# Patient Record
Sex: MALE | Race: WHITE | NOT HISPANIC OR LATINO | URBAN - METROPOLITAN AREA
[De-identification: names, ages, dates, MRNs, and addresses within clinical notes are randomized per-mention and may not be internally consistent; named-entity substitution may affect disease eponyms.]

---

## 2023-02-10 PROBLEM — S46.212S: Status: ACTIVE | Noted: 2023-02-10

## 2023-02-10 PROBLEM — R73.03 PREDIABETES: Status: ACTIVE | Noted: 2023-02-10

## 2023-02-10 PROBLEM — R20.2 LEFT HAND PARESTHESIA: Status: ACTIVE | Noted: 2023-02-10

## 2023-02-10 PROBLEM — D22.9 MULTIPLE NEVI: Status: ACTIVE | Noted: 2023-02-10

## 2023-02-10 PROBLEM — N52.9 MALE ERECTILE DISORDER: Status: ACTIVE | Noted: 2023-02-10

## 2023-02-10 PROBLEM — R21 RASH, SKIN: Status: ACTIVE | Noted: 2023-02-10

## 2023-02-10 PROBLEM — M25.541 JOINT PAIN IN BOTH HANDS: Status: ACTIVE | Noted: 2023-02-10

## 2023-02-10 PROBLEM — N50.82 ACUTE PAIN IN SCROTUM: Status: ACTIVE | Noted: 2023-02-10

## 2023-02-10 PROBLEM — M54.2 NECK PAIN: Status: ACTIVE | Noted: 2023-02-10

## 2023-02-10 PROBLEM — E66.9 OBESITY (BMI 30-39.9): Status: ACTIVE | Noted: 2023-02-10

## 2023-02-10 PROBLEM — E78.5 DYSLIPIDEMIA: Status: ACTIVE | Noted: 2023-02-10

## 2023-02-10 PROBLEM — H60.12: Status: ACTIVE | Noted: 2023-02-10

## 2023-02-10 PROBLEM — M19.012 ARTHRITIS OF LEFT ACROMIOCLAVICULAR JOINT: Status: ACTIVE | Noted: 2023-02-10

## 2023-02-10 PROBLEM — G89.29 MID BACK PAIN, CHRONIC: Status: ACTIVE | Noted: 2023-02-10

## 2023-02-10 PROBLEM — M67.814 BICEPS TENDINOSIS OF LEFT SHOULDER: Status: ACTIVE | Noted: 2023-02-10

## 2023-02-10 PROBLEM — M25.539 WRIST PAIN: Status: ACTIVE | Noted: 2023-02-10

## 2023-02-10 PROBLEM — M25.542 JOINT PAIN IN BOTH HANDS: Status: ACTIVE | Noted: 2023-02-10

## 2023-02-10 PROBLEM — G89.29 CHRONIC BILATERAL LOW BACK PAIN WITHOUT SCIATICA: Status: ACTIVE | Noted: 2023-02-10

## 2023-02-10 PROBLEM — H60.549 DERMATITIS OF EAR CANAL: Status: ACTIVE | Noted: 2023-02-10

## 2023-02-10 PROBLEM — M67.929 BICEPS TENDINOPATHY: Status: ACTIVE | Noted: 2023-02-10

## 2023-02-10 PROBLEM — G47.30 SLEEP APNEA: Status: ACTIVE | Noted: 2023-02-10

## 2023-02-10 PROBLEM — M54.9 MID BACK PAIN, CHRONIC: Status: ACTIVE | Noted: 2023-02-10

## 2023-02-10 PROBLEM — R40.0 DAYTIME SOMNOLENCE: Status: ACTIVE | Noted: 2023-02-10

## 2023-02-10 PROBLEM — L30.1 DYSHYDROSIS: Status: ACTIVE | Noted: 2023-02-10

## 2023-02-10 PROBLEM — M54.50 CHRONIC BILATERAL LOW BACK PAIN WITHOUT SCIATICA: Status: ACTIVE | Noted: 2023-02-10

## 2023-02-10 PROBLEM — G56.32: Status: ACTIVE | Noted: 2023-02-10

## 2023-02-10 PROBLEM — M25.511 CHRONIC PAIN OF BOTH SHOULDERS: Status: RESOLVED | Noted: 2023-02-10 | Resolved: 2023-02-10

## 2023-02-10 PROBLEM — G47.00 PERSISTENT INSOMNIA: Status: ACTIVE | Noted: 2023-02-10

## 2023-02-10 PROBLEM — G89.29 CHRONIC PAIN OF BOTH SHOULDERS: Status: RESOLVED | Noted: 2023-02-10 | Resolved: 2023-02-10

## 2023-02-10 PROBLEM — K21.9 CHRONIC GERD: Status: ACTIVE | Noted: 2023-02-10

## 2023-02-10 PROBLEM — M25.512 CHRONIC PAIN OF BOTH SHOULDERS: Status: RESOLVED | Noted: 2023-02-10 | Resolved: 2023-02-10

## 2023-02-10 PROBLEM — S60.552A: Status: ACTIVE | Noted: 2023-02-10

## 2023-02-10 PROBLEM — G56.02 CARPAL TUNNEL SYNDROME OF LEFT WRIST: Status: ACTIVE | Noted: 2023-02-10

## 2023-02-10 PROBLEM — R10.13 DYSPEPSIA: Status: ACTIVE | Noted: 2023-02-10

## 2023-02-10 RX ORDER — CLOBETASOL PROPIONATE 0.5 MG/G
1 CREAM TOPICAL DAILY
COMMUNITY
End: 2024-04-09 | Stop reason: SDUPTHER

## 2023-02-10 RX ORDER — CLOBETASOL PROPIONATE 0.5 MG/G
1 CREAM TOPICAL 2 TIMES DAILY
COMMUNITY
End: 2023-03-06 | Stop reason: SDUPTHER

## 2023-02-10 RX ORDER — ESOMEPRAZOLE MAGNESIUM 40 MG/1
40 CAPSULE, DELAYED RELEASE ORAL
COMMUNITY
Start: 2019-10-02

## 2023-02-28 LAB
ALANINE AMINOTRANSFERASE (SGPT) (U/L) IN SER/PLAS: 20 U/L (ref 10–52)
ALBUMIN (G/DL) IN SER/PLAS: 4.3 G/DL (ref 3.4–5)
ALKALINE PHOSPHATASE (U/L) IN SER/PLAS: 60 U/L (ref 33–120)
ANION GAP IN SER/PLAS: 12 MMOL/L (ref 10–20)
APPEARANCE, URINE: NORMAL
ASPARTATE AMINOTRANSFERASE (SGOT) (U/L) IN SER/PLAS: 18 U/L (ref 9–39)
BILIRUBIN TOTAL (MG/DL) IN SER/PLAS: 0.9 MG/DL (ref 0–1.2)
BILIRUBIN, URINE: NEGATIVE
BLOOD, URINE: NEGATIVE
CALCIUM (MG/DL) IN SER/PLAS: 9.3 MG/DL (ref 8.6–10.6)
CARBON DIOXIDE, TOTAL (MMOL/L) IN SER/PLAS: 29 MMOL/L (ref 21–32)
CHLORIDE (MMOL/L) IN SER/PLAS: 104 MMOL/L (ref 98–107)
CHOLESTEROL (MG/DL) IN SER/PLAS: 161 MG/DL (ref 0–199)
CHOLESTEROL IN HDL (MG/DL) IN SER/PLAS: 39.9 MG/DL
CHOLESTEROL/HDL RATIO: 4
COLOR, URINE: YELLOW
CREATININE (MG/DL) IN SER/PLAS: 0.9 MG/DL (ref 0.5–1.3)
ERYTHROCYTE DISTRIBUTION WIDTH (RATIO) BY AUTOMATED COUNT: 11.6 % (ref 11.5–14.5)
ERYTHROCYTE MEAN CORPUSCULAR HEMOGLOBIN CONCENTRATION (G/DL) BY AUTOMATED: 33.8 G/DL (ref 32–36)
ERYTHROCYTE MEAN CORPUSCULAR VOLUME (FL) BY AUTOMATED COUNT: 91 FL (ref 80–100)
ERYTHROCYTES (10*6/UL) IN BLOOD BY AUTOMATED COUNT: 5.01 X10E12/L (ref 4.5–5.9)
GFR MALE: >90 ML/MIN/1.73M2
GLUCOSE (MG/DL) IN SER/PLAS: 91 MG/DL (ref 74–99)
GLUCOSE, URINE: NEGATIVE MG/DL
HEMATOCRIT (%) IN BLOOD BY AUTOMATED COUNT: 45.6 % (ref 41–52)
HEMOGLOBIN (G/DL) IN BLOOD: 15.4 G/DL (ref 13.5–17.5)
KETONES, URINE: NEGATIVE MG/DL
LDL: 92 MG/DL (ref 0–99)
LEUKOCYTE ESTERASE, URINE: NEGATIVE
LEUKOCYTES (10*3/UL) IN BLOOD BY AUTOMATED COUNT: 6.9 X10E9/L (ref 4.4–11.3)
NITRITE, URINE: NEGATIVE
NRBC (PER 100 WBCS) BY AUTOMATED COUNT: 0 /100 WBC (ref 0–0)
PH, URINE: 5 (ref 5–8)
PLATELETS (10*3/UL) IN BLOOD AUTOMATED COUNT: 253 X10E9/L (ref 150–450)
POTASSIUM (MMOL/L) IN SER/PLAS: 3.6 MMOL/L (ref 3.5–5.3)
PROSTATE SPECIFIC AG (NG/ML) IN SER/PLAS: 1.11 NG/ML (ref 0–4)
PROTEIN TOTAL: 6.9 G/DL (ref 6.4–8.2)
PROTEIN, URINE: NEGATIVE MG/DL
SODIUM (MMOL/L) IN SER/PLAS: 141 MMOL/L (ref 136–145)
SPECIFIC GRAVITY, URINE: 1.03 (ref 1–1.03)
THYROTROPIN (MIU/L) IN SER/PLAS BY DETECTION LIMIT <= 0.05 MIU/L: 2.15 MIU/L (ref 0.44–3.98)
TRIGLYCERIDE (MG/DL) IN SER/PLAS: 146 MG/DL (ref 0–149)
UREA NITROGEN (MG/DL) IN SER/PLAS: 19 MG/DL (ref 6–23)
UROBILINOGEN, URINE: <2 MG/DL (ref 0–1.9)
VLDL: 29 MG/DL (ref 0–40)

## 2023-03-01 LAB
ANTI-NUCLEAR ANTIBODY (ANA): NEGATIVE
RHEUMATOID FACTOR (IU/ML) IN SERUM OR PLASMA: <10 IU/ML (ref 0–15)
SEDIMENTATION RATE, ERYTHROCYTE: 9 MM/H (ref 0–20)
URATE (MG/DL) IN SER/PLAS: 4.7 MG/DL (ref 4–7.5)

## 2023-03-06 ENCOUNTER — OFFICE VISIT (OUTPATIENT)
Dept: PRIMARY CARE | Facility: CLINIC | Age: 53
End: 2023-03-06
Payer: COMMERCIAL

## 2023-03-06 VITALS
BODY MASS INDEX: 29.33 KG/M2 | HEART RATE: 81 BPM | DIASTOLIC BLOOD PRESSURE: 84 MMHG | SYSTOLIC BLOOD PRESSURE: 114 MMHG | TEMPERATURE: 97.6 F | RESPIRATION RATE: 16 BRPM | OXYGEN SATURATION: 97 % | HEIGHT: 69 IN | WEIGHT: 198 LBS

## 2023-03-06 DIAGNOSIS — R74.8 LOW SERUM HDL: ICD-10-CM

## 2023-03-06 DIAGNOSIS — E66.3 OVERWEIGHT (BMI 25.0-29.9): ICD-10-CM

## 2023-03-06 DIAGNOSIS — Z12.5 SCREENING FOR PROSTATE CANCER: ICD-10-CM

## 2023-03-06 DIAGNOSIS — Z13.228 SCREENING FOR METABOLIC DISORDER: ICD-10-CM

## 2023-03-06 DIAGNOSIS — Z00.00 HEALTH MAINTENANCE EXAMINATION: Primary | ICD-10-CM

## 2023-03-06 DIAGNOSIS — Z13.0 SCREENING FOR DEFICIENCY ANEMIA: Primary | ICD-10-CM

## 2023-03-06 DIAGNOSIS — Z13.220 SCREENING, LIPID: ICD-10-CM

## 2023-03-06 DIAGNOSIS — Z12.5 SCREENING FOR MALIGNANT NEOPLASM OF PROSTATE: ICD-10-CM

## 2023-03-06 PROCEDURE — 99396 PREV VISIT EST AGE 40-64: CPT | Performed by: FAMILY MEDICINE

## 2023-03-06 PROCEDURE — 1036F TOBACCO NON-USER: CPT | Performed by: FAMILY MEDICINE

## 2023-03-06 ASSESSMENT — ENCOUNTER SYMPTOMS
GASTROINTESTINAL NEGATIVE: 1
RESPIRATORY NEGATIVE: 1
FATIGUE: 0
CONSTITUTIONAL NEGATIVE: 1
ACTIVITY CHANGE: 0
CHEST TIGHTNESS: 0
EYES NEGATIVE: 1
ENDOCRINE NEGATIVE: 1
ARTHRALGIAS: 1

## 2023-03-06 NOTE — PROGRESS NOTES
"Subjective   Patient ID: Jose Prescott is a 52 y.o. male who presents for Follow-up.  He for physical. No newe issues. He is overdue for follow up with dentist. Vision and hearing ok - monitored at work.  Vaccines current. Exercising rarely. Diet is healthy. Not sexually active, has ED and takes sildenafil as needed. He is nonsmoker. Has 1-2 beers per week.         Review of Systems   Constitutional: Negative.  Negative for activity change and fatigue.   HENT: Negative.     Eyes: Negative.  Negative for visual disturbance.   Respiratory: Negative.  Negative for chest tightness.    Cardiovascular:  Negative for chest pain.   Gastrointestinal: Negative.    Endocrine: Negative.    Genitourinary: Negative.    Musculoskeletal:  Positive for arthralgias (arthritis panel negative).       Objective   /84   Pulse 81   Temp 36.4 °C (97.6 °F)   Resp 16   Ht 1.753 m (5' 9\")   Wt 89.8 kg (198 lb)   SpO2 97%   BMI 29.24 kg/m²    Physical Exam  Vitals reviewed.   Constitutional:       Appearance: Normal appearance.   HENT:      Head: Normocephalic and atraumatic.      Right Ear: Tympanic membrane normal.      Left Ear: Tympanic membrane normal.   Cardiovascular:      Rate and Rhythm: Normal rate and regular rhythm.      Heart sounds: No murmur heard.  Pulmonary:      Breath sounds: Normal breath sounds.   Abdominal:      General: Abdomen is flat.   Musculoskeletal:         General: No swelling.      Cervical back: Neck supple.   Skin:     Findings: No lesion or rash.   Neurological:      Mental Status: He is alert.   Psychiatric:         Mood and Affect: Mood normal.           Assessment/Plan   Diagnoses and all orders for this visit:  Health maintenance examination - vaccines curretn. Labs reviewed and discussed. Colonoscopy current. See instructions.   Low serum HDL - recommend increasing cardiovascular exercise.   Overweight (BMI 25.0-29.9) - recommend low carb diet, increasing water intake to at least 64oz/day, " healthy snacking between meals, and regular cardiovascular exercise 150mins/week. Goal for weight loss is 1-2# per week.   Screening for malignant neoplasm of prostate

## 2023-03-06 NOTE — PROGRESS NOTES
"Subjective   Patient ID: Jose Prescott is a 52 y.o. male who presents for Follow-up.    HPI     Review of Systems    Objective   /90   Pulse 81   Temp 36.4 °C (97.6 °F)   Resp 16   Ht 1.753 m (5' 9\")   Wt 89.8 kg (198 lb)   SpO2 97%   BMI 29.24 kg/m²     Physical Exam    Assessment/Plan          "

## 2023-04-24 LAB
ALANINE AMINOTRANSFERASE (SGPT) (U/L) IN SER/PLAS: 21 U/L (ref 10–52)
ALBUMIN (G/DL) IN SER/PLAS: 4.4 G/DL (ref 3.4–5)
ALKALINE PHOSPHATASE (U/L) IN SER/PLAS: 52 U/L (ref 33–120)
ANION GAP IN SER/PLAS: 14 MMOL/L (ref 10–20)
ASPARTATE AMINOTRANSFERASE (SGOT) (U/L) IN SER/PLAS: 17 U/L (ref 9–39)
BASOPHILS (10*3/UL) IN BLOOD BY AUTOMATED COUNT: 0.04 X10E9/L (ref 0–0.1)
BASOPHILS/100 LEUKOCYTES IN BLOOD BY AUTOMATED COUNT: 0.5 % (ref 0–2)
BILIRUBIN TOTAL (MG/DL) IN SER/PLAS: 0.6 MG/DL (ref 0–1.2)
CALCIUM (MG/DL) IN SER/PLAS: 9.2 MG/DL (ref 8.6–10.6)
CARBON DIOXIDE, TOTAL (MMOL/L) IN SER/PLAS: 28 MMOL/L (ref 21–32)
CHLORIDE (MMOL/L) IN SER/PLAS: 104 MMOL/L (ref 98–107)
CREATININE (MG/DL) IN SER/PLAS: 0.82 MG/DL (ref 0.5–1.3)
EOSINOPHILS (10*3/UL) IN BLOOD BY AUTOMATED COUNT: 0.06 X10E9/L (ref 0–0.7)
EOSINOPHILS/100 LEUKOCYTES IN BLOOD BY AUTOMATED COUNT: 0.7 % (ref 0–6)
ERYTHROCYTE DISTRIBUTION WIDTH (RATIO) BY AUTOMATED COUNT: 11.9 % (ref 11.5–14.5)
ERYTHROCYTE MEAN CORPUSCULAR HEMOGLOBIN CONCENTRATION (G/DL) BY AUTOMATED: 34.6 G/DL (ref 32–36)
ERYTHROCYTE MEAN CORPUSCULAR VOLUME (FL) BY AUTOMATED COUNT: 90 FL (ref 80–100)
ERYTHROCYTES (10*6/UL) IN BLOOD BY AUTOMATED COUNT: 4.82 X10E12/L (ref 4.5–5.9)
GFR MALE: >90 ML/MIN/1.73M2
GLUCOSE (MG/DL) IN SER/PLAS: 81 MG/DL (ref 74–99)
HEMATOCRIT (%) IN BLOOD BY AUTOMATED COUNT: 43.6 % (ref 41–52)
HEMOGLOBIN (G/DL) IN BLOOD: 15.1 G/DL (ref 13.5–17.5)
IMMATURE GRANULOCYTES/100 LEUKOCYTES IN BLOOD BY AUTOMATED COUNT: 0.2 % (ref 0–0.9)
LEUKOCYTES (10*3/UL) IN BLOOD BY AUTOMATED COUNT: 8.5 X10E9/L (ref 4.4–11.3)
LYMPHOCYTES (10*3/UL) IN BLOOD BY AUTOMATED COUNT: 1.62 X10E9/L (ref 1.2–4.8)
LYMPHOCYTES/100 LEUKOCYTES IN BLOOD BY AUTOMATED COUNT: 19 % (ref 13–44)
MONOCYTES (10*3/UL) IN BLOOD BY AUTOMATED COUNT: 0.52 X10E9/L (ref 0.1–1)
MONOCYTES/100 LEUKOCYTES IN BLOOD BY AUTOMATED COUNT: 6.1 % (ref 2–10)
NEUTROPHILS (10*3/UL) IN BLOOD BY AUTOMATED COUNT: 6.26 X10E9/L (ref 1.2–7.7)
NEUTROPHILS/100 LEUKOCYTES IN BLOOD BY AUTOMATED COUNT: 73.5 % (ref 40–80)
NRBC (PER 100 WBCS) BY AUTOMATED COUNT: 0 /100 WBC (ref 0–0)
PLATELETS (10*3/UL) IN BLOOD AUTOMATED COUNT: 247 X10E9/L (ref 150–450)
POTASSIUM (MMOL/L) IN SER/PLAS: 3.7 MMOL/L (ref 3.5–5.3)
PROTEIN TOTAL: 7 G/DL (ref 6.4–8.2)
SODIUM (MMOL/L) IN SER/PLAS: 142 MMOL/L (ref 136–145)
UREA NITROGEN (MG/DL) IN SER/PLAS: 18 MG/DL (ref 6–23)

## 2023-04-27 LAB
NIL(NEG) CONTROL SPOT COUNT: NORMAL
PANEL A SPOT COUNT: 0
PANEL B SPOT COUNT: 1
POS CONTROL SPOT COUNT: NORMAL
T-SPOT. TB INTERPRETATION: NEGATIVE

## 2024-04-01 RX ORDER — CLOBETASOL PROPIONATE 0.5 MG/G
CREAM TOPICAL
Qty: 60 G | Refills: 0 | OUTPATIENT
Start: 2024-04-01

## 2024-04-09 DIAGNOSIS — R21 RASH, SKIN: Primary | ICD-10-CM

## 2024-04-09 RX ORDER — CLOBETASOL PROPIONATE 0.5 MG/G
1 CREAM TOPICAL 2 TIMES DAILY
Qty: 60 G | Refills: 0 | Status: SHIPPED | OUTPATIENT
Start: 2024-04-09 | End: 2024-06-07 | Stop reason: SDUPTHER

## 2024-06-07 ENCOUNTER — OFFICE VISIT (OUTPATIENT)
Dept: PRIMARY CARE | Facility: CLINIC | Age: 54
End: 2024-06-07
Payer: COMMERCIAL

## 2024-06-07 VITALS
DIASTOLIC BLOOD PRESSURE: 67 MMHG | SYSTOLIC BLOOD PRESSURE: 110 MMHG | HEART RATE: 70 BPM | BODY MASS INDEX: 28.63 KG/M2 | TEMPERATURE: 97 F | WEIGHT: 200 LBS | OXYGEN SATURATION: 95 % | HEIGHT: 70 IN

## 2024-06-07 DIAGNOSIS — R21 RASH, SKIN: ICD-10-CM

## 2024-06-07 DIAGNOSIS — R35.1 BENIGN PROSTATIC HYPERPLASIA WITH NOCTURIA: ICD-10-CM

## 2024-06-07 DIAGNOSIS — Z00.00 HEALTH MAINTENANCE EXAMINATION: Primary | ICD-10-CM

## 2024-06-07 DIAGNOSIS — N40.1 BENIGN PROSTATIC HYPERPLASIA WITH NOCTURIA: ICD-10-CM

## 2024-06-07 PROCEDURE — 1036F TOBACCO NON-USER: CPT | Performed by: FAMILY MEDICINE

## 2024-06-07 PROCEDURE — 99396 PREV VISIT EST AGE 40-64: CPT | Performed by: FAMILY MEDICINE

## 2024-06-07 RX ORDER — CLOBETASOL PROPIONATE 0.5 MG/G
1 CREAM TOPICAL 2 TIMES DAILY
Qty: 60 G | Refills: 0 | Status: SHIPPED | OUTPATIENT
Start: 2024-06-07

## 2024-06-07 ASSESSMENT — ENCOUNTER SYMPTOMS
SHORTNESS OF BREATH: 0
DIARRHEA: 0
ADENOPATHY: 0
FEVER: 0
HEADACHES: 0
BACK PAIN: 0
DIFFICULTY URINATING: 0
NERVOUS/ANXIOUS: 0
BLOOD IN STOOL: 0
ABDOMINAL PAIN: 0
SORE THROAT: 0
ABDOMINAL DISTENTION: 0
BRUISES/BLEEDS EASILY: 0
CONSTIPATION: 0
COUGH: 0
FATIGUE: 0
WEAKNESS: 0
EYE REDNESS: 0
DYSPHORIC MOOD: 0
CHILLS: 0
ARTHRALGIAS: 0
APPETITE CHANGE: 0
CHEST TIGHTNESS: 0
EYE PAIN: 0
DIZZINESS: 0
DYSURIA: 0

## 2024-06-07 ASSESSMENT — PAIN SCALES - GENERAL: PAINLEVEL: 0-NO PAIN

## 2024-06-07 NOTE — PROGRESS NOTES
"Subjective   Patient ID: Jose Prescott is a 53 y.o. male who presents for Annual Exam (/).  PMHX, PSHx, Fam hx, and Social hx reviewed.   New concerns  - Eczema improving after steroid injections with Derm, still using topical Clobetasol as needed.  Vaccines current  Dentist seen at least yearly yes  Vision concerns none  Hearing concerns none  Diet is usually overall healthy.   Smoker - no  Lung CT/screening - NA  AAA screening - NA  Alcohol use - 0-2 drinks  Exercising 3 days per week.   Sexually active - yes, no issues  Colonoscopy 2019, current               Review of Systems   Constitutional:  Negative for appetite change, chills, fatigue and fever.   HENT:  Negative for congestion, hearing loss and sore throat.    Eyes:  Negative for pain, redness and visual disturbance.   Respiratory:  Negative for cough, chest tightness and shortness of breath.    Cardiovascular:  Negative for chest pain and leg swelling.   Gastrointestinal:  Negative for abdominal distention, abdominal pain, blood in stool, constipation and diarrhea.   Genitourinary:  Negative for difficulty urinating and dysuria.   Musculoskeletal:  Negative for arthralgias and back pain.   Skin:  Positive for rash (b/l hands).   Neurological:  Negative for dizziness, weakness and headaches.   Hematological:  Negative for adenopathy. Does not bruise/bleed easily.   Psychiatric/Behavioral:  Negative for dysphoric mood. The patient is not nervous/anxious.        Objective   /67 (BP Location: Left arm, Patient Position: Sitting, BP Cuff Size: Adult long)   Pulse 70   Temp 36.1 °C (97 °F) (Temporal)   Ht 1.778 m (5' 10\")   Wt 90.7 kg (200 lb)   SpO2 95%   BMI 28.70 kg/m²    Physical Exam  Constitutional:       General: He is not in acute distress.     Appearance: Normal appearance. He is not ill-appearing.   HENT:      Head: Normocephalic and atraumatic.      Right Ear: Tympanic membrane, ear canal and external ear normal.      Left Ear: Tympanic " membrane, ear canal and external ear normal.      Nose: Nose normal.      Mouth/Throat:      Mouth: Mucous membranes are moist.      Pharynx: No oropharyngeal exudate or posterior oropharyngeal erythema.   Eyes:      Extraocular Movements: Extraocular movements intact.      Conjunctiva/sclera: Conjunctivae normal.      Pupils: Pupils are equal, round, and reactive to light.   Neck:      Thyroid: No thyroid mass or thyromegaly.      Vascular: No carotid bruit.   Cardiovascular:      Rate and Rhythm: Normal rate and regular rhythm.      Heart sounds: Normal heart sounds. No murmur heard.  Pulmonary:      Breath sounds: Normal breath sounds. No wheezing, rhonchi or rales.   Abdominal:      General: Bowel sounds are normal. There is no distension.      Palpations: Abdomen is soft. There is no mass.      Tenderness: There is no abdominal tenderness.   Genitourinary:     Rectum: Guaiac result negative.      Comments: ~ 40 g prostate without nodules or asymmetry   Musculoskeletal:         General: No swelling or deformity.      Cervical back: Neck supple. No tenderness.   Lymphadenopathy:      Cervical: No cervical adenopathy.   Skin:     General: Skin is warm and dry.      Findings: No lesion or rash.   Neurological:      Mental Status: He is alert and oriented to person, place, and time.      Sensory: No sensory deficit.      Motor: No weakness.      Coordination: Coordination normal.      Deep Tendon Reflexes: Reflexes normal.   Psychiatric:         Mood and Affect: Mood normal.         Behavior: Behavior normal.         Judgment: Judgment normal.           Assessment/Plan   Diagnoses and all orders for this visit:  Health maintenance examination - vaccines current. Ordering fasting labs. Colonoscopy current.  -     CBC; Future  -     Comprehensive Metabolic Panel; Future  -     Hepatitis C Antibody; Future  -     Lipid Panel; Future  -     Prostate Specific Antigen, Screen; Future  -     Urinalysis with Reflex Culture  and Microscopic; Future  -     CT cardiac scoring wo IV contrast; Future  Rash/Eczema - continue  -     clobetasol (Temovate) 0.05 % cream; Apply 1 Application topically 2 times a day. Apply sparingly to affected area(s)  Benign prostatic hyperplasia with nocturia - mildly symptomatic  Follow up in 1 year for physical

## 2024-06-07 NOTE — PROGRESS NOTES
"Subjective   Patient ID: Jose Prescott is a 53 y.o. male who presents for Annual Exam (/).    HPI     Review of Systems    Objective   /67 (BP Location: Left arm, Patient Position: Sitting, BP Cuff Size: Adult long)   Pulse 70   Temp 36.1 °C (97 °F) (Temporal)   Ht 1.778 m (5' 10\")   Wt 90.7 kg (200 lb)   SpO2 95%   BMI 28.70 kg/m²     Physical Exam    Assessment/Plan          "

## 2024-08-05 ENCOUNTER — APPOINTMENT (OUTPATIENT)
Dept: DERMATOLOGY | Facility: CLINIC | Age: 54
End: 2024-08-05
Payer: COMMERCIAL

## 2024-08-05 ENCOUNTER — SPECIALTY PHARMACY (OUTPATIENT)
Dept: PHARMACY | Facility: CLINIC | Age: 54
End: 2024-08-05

## 2024-08-05 DIAGNOSIS — L30.9 HAND DERMATITIS: Primary | ICD-10-CM

## 2024-08-05 PROCEDURE — 99204 OFFICE O/P NEW MOD 45 MIN: CPT | Performed by: STUDENT IN AN ORGANIZED HEALTH CARE EDUCATION/TRAINING PROGRAM

## 2024-08-05 RX ORDER — DUPILUMAB 300 MG/2ML
INJECTION, SOLUTION SUBCUTANEOUS
Qty: 2 PEN | Refills: 11 | Status: SHIPPED | OUTPATIENT
Start: 2024-08-05

## 2024-08-05 RX ORDER — DUPILUMAB 300 MG/2ML
INJECTION, SOLUTION SUBCUTANEOUS
Qty: 2 PEN | Refills: 0 | Status: SHIPPED | OUTPATIENT
Start: 2024-08-05

## 2024-08-05 ASSESSMENT — DERMATOLOGY QUALITY OF LIFE (QOL) ASSESSMENT
DATE THE QUALITY-OF-LIFE ASSESSMENT WAS COMPLETED: 67057
RATE HOW BOTHERED YOU ARE BY EFFECTS OF YOUR SKIN PROBLEMS ON YOUR ACTIVITIES (EG, GOING OUT, ACCOMPLISHING WHAT YOU WANT, WORK ACTIVITIES OR YOUR RELATIONSHIPS WITH OTHERS): 6 - ALWAYS BOTHERED
RATE HOW EMOTIONALLY BOTHERED YOU ARE BY YOUR SKIN PROBLEM (FOR EXAMPLE, WORRY, EMBARRASSMENT, FRUSTRATION): 6 - ALWAYS BOTHERED
WHAT SINGLE SKIN CONDITION LISTED BELOW IS THE PATIENT ANSWERING THE QUALITY-OF-LIFE ASSESSMENT QUESTIONS ABOUT: NONE OF THE ABOVE
RATE HOW BOTHERED YOU ARE BY SYMPTOMS OF YOUR SKIN PROBLEM (EG, ITCHING, STINGING BURNING, HURTING OR SKIN IRRITATION): 6 - ALWAYS BOTHERED
RATE HOW BOTHERED YOU ARE BY SYMPTOMS OF YOUR SKIN PROBLEM (EG, ITCHING, STINGING BURNING, HURTING OR SKIN IRRITATION): 6 - ALWAYS BOTHERED
ARE THERE EXCLUSIONS OR EXCEPTIONS FOR THE QUALITY OF LIFE ASSESSMENT: NO
RATE HOW EMOTIONALLY BOTHERED YOU ARE BY YOUR SKIN PROBLEM (FOR EXAMPLE, WORRY, EMBARRASSMENT, FRUSTRATION): 6 - ALWAYS BOTHERED
RATE HOW BOTHERED YOU ARE BY EFFECTS OF YOUR SKIN PROBLEMS ON YOUR ACTIVITIES (EG, GOING OUT, ACCOMPLISHING WHAT YOU WANT, WORK ACTIVITIES OR YOUR RELATIONSHIPS WITH OTHERS): 6 - ALWAYS BOTHERED

## 2024-08-05 ASSESSMENT — PATIENT GLOBAL ASSESSMENT (PGA): WHAT IS THE PGA: PATIENT GLOBAL ASSESSMENT:  1 - CLEAR

## 2024-08-05 NOTE — PROGRESS NOTES
Subjective     Jose Prescott is a 54 y.o. male who presents for the following: Eczema (Flare up on bilateral hands. Currently treating with clobetasol cream).     Review of Systems:  No other skin or systemic complaints other than what is documented elsewhere in the note.    The following portions of the chart were reviewed this encounter and updated as appropriate:          Skin Cancer History  No skin cancer on file.      Specialty Problems          Dermatology Problems    Dyshydrosis    Multiple nevi    Rash, skin        Objective   Well appearing patient in no apparent distress; mood and affect are within normal limits.    A focused skin examination was performed. All findings within normal limits unless otherwise noted below.    Assessment/Plan   1. 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

## 2024-08-21 ENCOUNTER — TELEPHONE (OUTPATIENT)
Dept: DERMATOLOGY | Facility: CLINIC | Age: 54
End: 2024-08-21
Payer: COMMERCIAL

## 2024-09-05 ENCOUNTER — TELEPHONE (OUTPATIENT)
Dept: DERMATOLOGY | Facility: CLINIC | Age: 54
End: 2024-09-05
Payer: COMMERCIAL

## 2024-09-05 NOTE — TELEPHONE ENCOUNTER
Pt LVM stating he'd like to speak with Dr. Hagan about a Tx plan for his hands as nothing up to this point is working.

## 2024-09-13 ENCOUNTER — OFFICE VISIT (OUTPATIENT)
Dept: DERMATOLOGY | Facility: CLINIC | Age: 54
End: 2024-09-13
Payer: COMMERCIAL

## 2024-09-13 DIAGNOSIS — L30.9 HAND DERMATITIS: Primary | ICD-10-CM

## 2024-09-13 PROCEDURE — 99214 OFFICE O/P EST MOD 30 MIN: CPT | Performed by: STUDENT IN AN ORGANIZED HEALTH CARE EDUCATION/TRAINING PROGRAM

## 2024-09-13 ASSESSMENT — DERMATOLOGY PATIENT ASSESSMENT
HAVE YOU HAD OR DO YOU HAVE A STAPH INFECTION: NO
ARE YOU AN ORGAN TRANSPLANT RECIPIENT: NO
DO YOU USE SUNSCREEN: OCCASIONALLY
HAVE YOU HAD OR DO YOU HAVE VASCULAR DISEASE: NO
DO YOU USE A TANNING BED: NO
DO YOU HAVE ANY NEW OR CHANGING LESIONS: NO

## 2024-09-13 ASSESSMENT — DERMATOLOGY QUALITY OF LIFE (QOL) ASSESSMENT
RATE HOW BOTHERED YOU ARE BY EFFECTS OF YOUR SKIN PROBLEMS ON YOUR ACTIVITIES (EG, GOING OUT, ACCOMPLISHING WHAT YOU WANT, WORK ACTIVITIES OR YOUR RELATIONSHIPS WITH OTHERS): 6 - ALWAYS BOTHERED
RATE HOW EMOTIONALLY BOTHERED YOU ARE BY YOUR SKIN PROBLEM (FOR EXAMPLE, WORRY, EMBARRASSMENT, FRUSTRATION): 6 - ALWAYS BOTHERED
DATE THE QUALITY-OF-LIFE ASSESSMENT WAS COMPLETED: 67096
RATE HOW BOTHERED YOU ARE BY SYMPTOMS OF YOUR SKIN PROBLEM (EG, ITCHING, STINGING BURNING, HURTING OR SKIN IRRITATION): 6 - ALWAYS BOTHERED
ARE THERE EXCLUSIONS OR EXCEPTIONS FOR THE QUALITY OF LIFE ASSESSMENT: NO
WHAT SINGLE SKIN CONDITION LISTED BELOW IS THE PATIENT ANSWERING THE QUALITY-OF-LIFE ASSESSMENT QUESTIONS ABOUT: NONE OF THE ABOVE

## 2024-09-13 ASSESSMENT — ITCH NUMERIC RATING SCALE: HOW SEVERE IS YOUR ITCHING?: 10

## 2024-09-13 ASSESSMENT — PATIENT GLOBAL ASSESSMENT (PGA): PATIENT GLOBAL ASSESSMENT: PATIENT GLOBAL ASSESSMENT:  3 - MODERATE

## 2024-09-13 NOTE — PROGRESS NOTES
Subjective     Jose Prescott is a 54 y.o. male who presents for the following: dyshydrosis (Bilateral hands, steroid cream stopped working. ).     Review of Systems:  No other skin or systemic complaints other than what is documented elsewhere in the note.    The following portions of the chart were reviewed this encounter and updated as appropriate:          Skin Cancer History  No skin cancer on file.      Specialty Problems          Dermatology Problems    Dyshydrosis    Multiple nevi    Rash, skin        Objective   Well appearing patient in no apparent distress; mood and affect are within normal limits.    A focused skin examination was performed. All findings within normal limits unless otherwise noted below.    Assessment/Plan   1. 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    dupilumab (Dupixent Pen) 300 mg/2 mL pen injector  Inject 1 pen by subcutaneous injection every 14 days      Once blood work is back message me and ill start you on acitretin 10mg and consider increase to 20mg daily  The benefits of either Acitretin or Methotrexate is that they cover both psoriasis and eczema so the likelihood of either of those working is pretty high I just dont know to what degree they will work    Side effects of acitretin discussed in great detail and monthly labs requested    Insurance did not allow for dupixent

## 2024-09-14 ENCOUNTER — LAB (OUTPATIENT)
Dept: LAB | Facility: LAB | Age: 54
End: 2024-09-14
Payer: COMMERCIAL

## 2024-09-14 DIAGNOSIS — Z00.00 HEALTH MAINTENANCE EXAMINATION: ICD-10-CM

## 2024-09-14 LAB
ALBUMIN SERPL BCP-MCNC: 4.1 G/DL (ref 3.4–5)
ALP SERPL-CCNC: 63 U/L (ref 33–120)
ALT SERPL W P-5'-P-CCNC: 22 U/L (ref 10–52)
ANION GAP SERPL CALC-SCNC: 9 MMOL/L (ref 10–20)
APPEARANCE UR: CLEAR
AST SERPL W P-5'-P-CCNC: 20 U/L (ref 9–39)
BILIRUB SERPL-MCNC: 0.8 MG/DL (ref 0–1.2)
BILIRUB UR STRIP.AUTO-MCNC: NEGATIVE MG/DL
BUN SERPL-MCNC: 14 MG/DL (ref 6–23)
CALCIUM SERPL-MCNC: 9.2 MG/DL (ref 8.6–10.3)
CHLORIDE SERPL-SCNC: 105 MMOL/L (ref 98–107)
CHOLEST SERPL-MCNC: 188 MG/DL (ref 0–199)
CHOLESTEROL/HDL RATIO: 4.4
CO2 SERPL-SCNC: 30 MMOL/L (ref 21–32)
COLOR UR: YELLOW
CREAT SERPL-MCNC: 0.94 MG/DL (ref 0.5–1.3)
EGFRCR SERPLBLD CKD-EPI 2021: >90 ML/MIN/1.73M*2
ERYTHROCYTE [DISTWIDTH] IN BLOOD BY AUTOMATED COUNT: 11.8 % (ref 11.5–14.5)
GLUCOSE SERPL-MCNC: 99 MG/DL (ref 74–99)
GLUCOSE UR STRIP.AUTO-MCNC: NORMAL MG/DL
HCT VFR BLD AUTO: 46.4 % (ref 41–52)
HCV AB SER QL: NONREACTIVE
HDLC SERPL-MCNC: 42.3 MG/DL
HGB BLD-MCNC: 16.2 G/DL (ref 13.5–17.5)
KETONES UR STRIP.AUTO-MCNC: NEGATIVE MG/DL
LDLC SERPL CALC-MCNC: 123 MG/DL
LEUKOCYTE ESTERASE UR QL STRIP.AUTO: NEGATIVE
MCH RBC QN AUTO: 31.5 PG (ref 26–34)
MCHC RBC AUTO-ENTMCNC: 34.9 G/DL (ref 32–36)
MCV RBC AUTO: 90 FL (ref 80–100)
MUCOUS THREADS #/AREA URNS AUTO: NORMAL /LPF
NITRITE UR QL STRIP.AUTO: NEGATIVE
NON HDL CHOLESTEROL: 146 MG/DL (ref 0–149)
NRBC BLD-RTO: 0 /100 WBCS (ref 0–0)
PH UR STRIP.AUTO: 5.5 [PH]
PLATELET # BLD AUTO: 243 X10*3/UL (ref 150–450)
POTASSIUM SERPL-SCNC: 3.8 MMOL/L (ref 3.5–5.3)
PROT SERPL-MCNC: 6.9 G/DL (ref 6.4–8.2)
PROT UR STRIP.AUTO-MCNC: NORMAL MG/DL
PSA SERPL-MCNC: 1.11 NG/ML
RBC # BLD AUTO: 5.14 X10*6/UL (ref 4.5–5.9)
RBC # UR STRIP.AUTO: NEGATIVE /UL
RBC #/AREA URNS AUTO: NORMAL /HPF
SODIUM SERPL-SCNC: 140 MMOL/L (ref 136–145)
SP GR UR STRIP.AUTO: 1.03
TRIGL SERPL-MCNC: 114 MG/DL (ref 0–149)
UROBILINOGEN UR STRIP.AUTO-MCNC: NORMAL MG/DL
VLDL: 23 MG/DL (ref 0–40)
WBC # BLD AUTO: 6.6 X10*3/UL (ref 4.4–11.3)
WBC #/AREA URNS AUTO: NORMAL /HPF

## 2024-09-14 PROCEDURE — 86803 HEPATITIS C AB TEST: CPT

## 2024-09-14 PROCEDURE — 81001 URINALYSIS AUTO W/SCOPE: CPT

## 2024-09-14 PROCEDURE — 85027 COMPLETE CBC AUTOMATED: CPT

## 2024-09-14 PROCEDURE — 80053 COMPREHEN METABOLIC PANEL: CPT

## 2024-09-14 PROCEDURE — 84153 ASSAY OF PSA TOTAL: CPT

## 2024-09-14 PROCEDURE — 80061 LIPID PANEL: CPT

## 2024-09-14 PROCEDURE — 36415 COLL VENOUS BLD VENIPUNCTURE: CPT

## 2024-09-16 ENCOUNTER — TELEPHONE (OUTPATIENT)
Dept: DERMATOLOGY | Facility: CLINIC | Age: 54
End: 2024-09-16
Payer: COMMERCIAL

## 2024-09-16 DIAGNOSIS — L30.9 DERMATITIS: Primary | ICD-10-CM

## 2024-09-16 RX ORDER — ACITRETIN 10 MG/1
CAPSULE ORAL
Qty: 60 CAPSULE | Refills: 1 | Status: SHIPPED | OUTPATIENT
Start: 2024-09-16

## 2024-09-16 NOTE — TELEPHONE ENCOUNTER
Pt called on 9/16/2024 to let us know that he got blood work this past weekend for Dr. Hagan to please look over.

## 2024-10-07 ENCOUNTER — APPOINTMENT (OUTPATIENT)
Dept: DERMATOLOGY | Facility: CLINIC | Age: 54
End: 2024-10-07
Payer: COMMERCIAL

## 2024-11-18 ENCOUNTER — APPOINTMENT (OUTPATIENT)
Dept: DERMATOLOGY | Facility: CLINIC | Age: 54
End: 2024-11-18
Payer: COMMERCIAL

## 2024-12-09 ENCOUNTER — APPOINTMENT (OUTPATIENT)
Dept: DERMATOLOGY | Facility: CLINIC | Age: 54
End: 2024-12-09
Payer: COMMERCIAL

## 2025-03-01 ENCOUNTER — HOSPITAL ENCOUNTER (OUTPATIENT)
Dept: RADIOLOGY | Facility: CLINIC | Age: 55
Discharge: HOME | End: 2025-03-01
Payer: COMMERCIAL

## 2025-03-01 DIAGNOSIS — Z00.00 HEALTH MAINTENANCE EXAMINATION: ICD-10-CM

## 2025-03-01 PROCEDURE — 75571 CT HRT W/O DYE W/CA TEST: CPT

## 2025-03-03 DIAGNOSIS — E78.5 DYSLIPIDEMIA: ICD-10-CM

## 2025-03-03 DIAGNOSIS — R93.1 AGATSTON CAC SCORE, <100: Primary | ICD-10-CM

## 2025-03-03 RX ORDER — ROSUVASTATIN CALCIUM 10 MG/1
10 TABLET, COATED ORAL EVERY EVENING
Qty: 90 TABLET | Refills: 1 | Status: SHIPPED | OUTPATIENT
Start: 2025-03-03 | End: 2026-04-07

## 2025-06-17 ENCOUNTER — APPOINTMENT (OUTPATIENT)
Dept: PRIMARY CARE | Facility: CLINIC | Age: 55
End: 2025-06-17
Payer: COMMERCIAL

## 2025-06-17 VITALS
OXYGEN SATURATION: 98 % | BODY MASS INDEX: 29.06 KG/M2 | HEIGHT: 70 IN | SYSTOLIC BLOOD PRESSURE: 122 MMHG | WEIGHT: 203 LBS | DIASTOLIC BLOOD PRESSURE: 78 MMHG | HEART RATE: 74 BPM | RESPIRATION RATE: 12 BRPM

## 2025-06-17 DIAGNOSIS — E78.5 DYSLIPIDEMIA: ICD-10-CM

## 2025-06-17 DIAGNOSIS — Z12.5 SCREENING PSA (PROSTATE SPECIFIC ANTIGEN): ICD-10-CM

## 2025-06-17 DIAGNOSIS — R19.5 CHANGE IN STOOL CALIBER: Primary | ICD-10-CM

## 2025-06-17 DIAGNOSIS — R93.1 AGATSTON CAC SCORE, <100: ICD-10-CM

## 2025-06-17 DIAGNOSIS — H93.13 TINNITUS OF BOTH EARS: ICD-10-CM

## 2025-06-17 DIAGNOSIS — R10.9 ABDOMINAL CRAMPING: ICD-10-CM

## 2025-06-17 PROCEDURE — 3008F BODY MASS INDEX DOCD: CPT | Performed by: FAMILY MEDICINE

## 2025-06-17 PROCEDURE — 1036F TOBACCO NON-USER: CPT | Performed by: FAMILY MEDICINE

## 2025-06-17 PROCEDURE — 99213 OFFICE O/P EST LOW 20 MIN: CPT | Performed by: FAMILY MEDICINE

## 2025-06-17 RX ORDER — ROSUVASTATIN CALCIUM 10 MG/1
10 TABLET, COATED ORAL EVERY EVENING
Qty: 90 TABLET | Refills: 0 | Status: SHIPPED | OUTPATIENT
Start: 2025-06-17 | End: 2026-07-22

## 2025-06-17 ASSESSMENT — ENCOUNTER SYMPTOMS
ARTHRALGIAS: 0
APPETITE CHANGE: 0
CHILLS: 0
DIARRHEA: 0
ABDOMINAL DISTENTION: 0
FEVER: 0
COUGH: 0
DIFFICULTY URINATING: 0
DYSURIA: 0
DYSPHORIC MOOD: 0
FATIGUE: 0
ABDOMINAL PAIN: 1
NERVOUS/ANXIOUS: 0
CHEST TIGHTNESS: 0
DIZZINESS: 0
BLOOD IN STOOL: 0
HEADACHES: 0
BRUISES/BLEEDS EASILY: 0
SHORTNESS OF BREATH: 0
EYE REDNESS: 0
CONSTIPATION: 0
WEAKNESS: 0
ADENOPATHY: 0
EYE PAIN: 0
SORE THROAT: 0
BACK PAIN: 0

## 2025-06-17 ASSESSMENT — PATIENT HEALTH QUESTIONNAIRE - PHQ9
SUM OF ALL RESPONSES TO PHQ9 QUESTIONS 1 AND 2: 0
2. FEELING DOWN, DEPRESSED OR HOPELESS: NOT AT ALL
1. LITTLE INTEREST OR PLEASURE IN DOING THINGS: NOT AT ALL

## 2025-06-17 NOTE — PROGRESS NOTES
"Subjective   Patient ID: Jose Prescott is a 54 y.o. male who presents for Abdominal Pain.  Pt has intermittent abdominal cramping and change in bowel habits . Now over the past month it is worse and stool is narrower and small volume. He had Colonoscopy in 2019, nl. EGD in '21 and '23 essentially nl. He is taking Nexium which usually controls acid reflux due to HH.  He was seeing Dr Bowden but wants to see someone closer.        Abdominal Pain  Pertinent negatives include no arthralgias, constipation, diarrhea, dysuria, fever or headaches.       Review of Systems   Constitutional:  Negative for appetite change, chills, fatigue and fever.   HENT:  Negative for congestion, hearing loss and sore throat.    Eyes:  Negative for pain, redness and visual disturbance.   Respiratory:  Negative for cough, chest tightness and shortness of breath.    Cardiovascular:  Negative for chest pain and leg swelling.   Gastrointestinal:  Positive for abdominal pain. Negative for abdominal distention, blood in stool, constipation and diarrhea.   Genitourinary:  Negative for difficulty urinating and dysuria.   Musculoskeletal:  Negative for arthralgias and back pain.   Skin:  Negative for rash.   Neurological:  Negative for dizziness, weakness and headaches.   Hematological:  Negative for adenopathy. Does not bruise/bleed easily.   Psychiatric/Behavioral:  Negative for dysphoric mood. The patient is not nervous/anxious.        Objective   /78   Pulse 74   Resp 12   Ht 1.778 m (5' 10\")   Wt 92.1 kg (203 lb)   SpO2 98%   BMI 29.13 kg/m²    Physical Exam  Constitutional:       General: He is not in acute distress.     Appearance: Normal appearance.   Cardiovascular:      Rate and Rhythm: Normal rate and regular rhythm.      Heart sounds: Normal heart sounds. No murmur heard.  Pulmonary:      Effort: Pulmonary effort is normal.      Breath sounds: Normal breath sounds.   Abdominal:      Palpations: Abdomen is soft.      Tenderness: " There is no abdominal tenderness.   Neurological:      Mental Status: He is alert.   Psychiatric:         Mood and Affect: Mood normal.         Judgment: Judgment normal.           Assessment/Plan   Diagnoses and all orders for this visit:  Change in stool caliber/Abdominal cramping - worsening. Referring to GI. Fro now can try fiber  ( Ie. Benefiber 1 round tsp daily) and probiotic (Ie. Culturelle or Align).  Tinnitus of both ears - worsening, referring to ENT  Agatston CAC score, <100/Dyslipidemia - recommend starting Rosuvastatin for risk reduction of heart attack and stroke.    Follow up in 3 months, for physical

## 2025-06-17 NOTE — PROGRESS NOTES
"Subjective   Patient ID: Jose Prescott is a 54 y.o. male who presents for Abdominal Pain.    HPI     Review of Systems    Objective   /78   Pulse 74   Resp 12   Ht 1.778 m (5' 10\")   Wt 92.1 kg (203 lb)   SpO2 98%   BMI 29.13 kg/m²     Physical Exam    Assessment/Plan          "

## 2025-06-25 RX ORDER — PIMECROLIMUS 10 MG/G
CREAM TOPICAL 2 TIMES DAILY
COMMUNITY
Start: 2024-10-25

## 2025-06-30 ENCOUNTER — APPOINTMENT (OUTPATIENT)
Dept: GASTROENTEROLOGY | Facility: CLINIC | Age: 55
End: 2025-06-30
Payer: COMMERCIAL

## 2025-06-30 VITALS — BODY MASS INDEX: 30.36 KG/M2 | HEART RATE: 77 BPM | WEIGHT: 205 LBS | HEIGHT: 69 IN

## 2025-06-30 DIAGNOSIS — R19.5 CHANGE IN STOOL CALIBER: ICD-10-CM

## 2025-06-30 DIAGNOSIS — R10.84 GENERALIZED ABDOMINAL PAIN: ICD-10-CM

## 2025-06-30 DIAGNOSIS — R19.4 CHANGE IN BOWEL HABIT: Primary | ICD-10-CM

## 2025-06-30 PROCEDURE — 3008F BODY MASS INDEX DOCD: CPT | Performed by: INTERNAL MEDICINE

## 2025-06-30 PROCEDURE — 1036F TOBACCO NON-USER: CPT | Performed by: INTERNAL MEDICINE

## 2025-06-30 PROCEDURE — 99204 OFFICE O/P NEW MOD 45 MIN: CPT | Performed by: INTERNAL MEDICINE

## 2025-06-30 NOTE — PROGRESS NOTES
"Primary Care Provider: Rock Nunez MD  Referring Provider: Rock Nunez MD  My final recommendations will be communicated back to the referring physician and/or primary care provider via shared medical records or via US mail.    Chief Complaint (Reason for visit):   Jose Prescott is a 54 y.o. male who presents for change in stool caliber and consistency    ASSESSMENT AND PLAN     Assessment & Plan  Change in bowel habit  Feeling of incomplete evacuation  Along with thin caliber stool, change to muddy consistency      2019 Cscope reviewed - normal    - increase fiber to 30 gm per day  - increase fluids to 2-3 Liter per day  - cscope    [] ARM and biofeedback, if trial of laxatives like Senna, miralax does not work    Orders:    Colonoscopy Diagnostic; Future    Change in stool caliber    Orders:    Referral to Gastroenterology    Colonoscopy Diagnostic; Future    Generalized abdominal pain    Orders:    Referral to Gastroenterology    Colonoscopy Diagnostic; Future      I discussed the risks, benefits and alternative(s) of the procedure(s) with the patient. Pt verbalizes understanding and is willing to proceed. All questions were answered.    Melvin Elise MD, MS    SUBJECTIVE   HPI: History obtained from patient    55 yo who comes to see me for change in stool caliber - thin stools, feeling of incomplete evacuation and change in consistency - muddy stools    REDFLAGS  Pt denies any blood in stool, black stools  Pt denies unintentional weight loss, fever, chills, night sweats  Pt denies family history of GI cancer      Medical History[1]    Surgical History[2]    Current Medications[3]    Allergies[4]  OBJECTIVE   PHYSICAL EXAM:  Pulse 77   Ht 1.753 m (5' 9\")   Wt 93 kg (205 lb)   BMI 30.27 kg/m²      LABS/IMAGING/SCOPES  Lab Results   Component Value Date    WBC 6.6 09/14/2024    HGB 16.2 09/14/2024    HCT 46.4 09/14/2024    MCV 90 09/14/2024     09/14/2024     Lab Results   Component " Value Date    GLUCOSE 99 09/14/2024    CALCIUM 9.2 09/14/2024     09/14/2024    K 3.8 09/14/2024    CO2 30 09/14/2024     09/14/2024    BUN 14 09/14/2024    CREATININE 0.94 09/14/2024     Lab Results   Component Value Date    ALT 22 09/14/2024    AST 20 09/14/2024    ALKPHOS 63 09/14/2024    BILITOT 0.8 09/14/2024       === 03/01/25 ===    CT CARDIAC SCORING WO IV CONTRAST    - Impression -  1. Coronary artery calcium score of 30.77*.    *Coronary Artery  Agatston score    Score  risk  Very low 1-99  Mildly increased 100-299  Moderately increased >300  Moderate to severely increased >800    Izzy et al. JCCT 2016 (http://dx.doi.org/10.1016/j.jcct.2016.11.003)    AVILA 10-Year CHD Risk with Coronary Artery Calcification can be  calcuate using link below  [email or web address]  Eleanor pena al. JACC 2015 (http://dx.doi.org/10.1016/j.j  acc.2015.08.035)    Signed by: Jose Wright 3/3/2025 12:49 PM  Dictation workstation:   [id number]    Melvin Elise MD, MS       [1]   Past Medical History:  Diagnosis Date    Arthritis     Chronic pain of both shoulders 02/10/2023    Eczema     GERD (gastroesophageal reflux disease)     Varicella     Visual impairment    [2]   Past Surgical History:  Procedure Laterality Date    APPENDECTOMY      OTHER SURGICAL HISTORY  05/16/2019    Appendectomy    VASECTOMY     [3]   Current Outpatient Medications   Medication Sig Dispense Refill    dupilumab (Dupixent Pen) 300 mg/2 mL pen injector Inject 1 pen by subcutaneous injection every 14 days 2 Pen 11    esomeprazole (NexIUM) 40 mg DR capsule Take 1 capsule (40 mg) by mouth.      rosuvastatin (Crestor) 10 mg tablet Take 1 tablet (10 mg) by mouth once daily in the evening. 90 tablet 0    acitretin (Soriatane) 10 mg capsule Take once daily. If well tolerated after 2-3 weeks, can increase to twice daily if skin  hasn't responded (Patient not taking: No sig reported) 60 capsule 1     pimecrolimus (Elidel) 1 % cream Apply topically 2 times a day. to affected area (Patient not taking: Reported on 6/30/2025)       No current facility-administered medications for this visit.   [4] No Known Allergies

## 2025-06-30 NOTE — LETTER
June 30, 2025     Rock Nunez MD  [address]  [facility], [address]  [facility] [address]    Patient: Jose Prescott   YOB: 1970   Date of Visit: 6/30/2025       Dear Dr. Rock Nunez MD:    Thank you for referring Jose Prescott to me for evaluation. Below are my notes for this consultation.  If you have questions, please do not hesitate to call me. I look forward to following your patient along with you.       Sincerely,     Melvin Elise MD, MS      CC: No Recipients  ______________________________________________________________________________________    Primary Care Provider: Rock Nunez MD  Referring Provider: Rock Nunez MD  My final recommendations will be communicated back to the referring physician and/or primary care provider via shared medical records or via US mail.    Chief Complaint (Reason for visit):   Jose Prescott is a 54 y.o. male who presents for change in stool caliber and consistency    ASSESSMENT AND PLAN     Assessment & Plan  Change in bowel habit  Feeling of incomplete evacuation  Along with thin caliber stool, change to muddy consistency      2019 Cscope reviewed - normal    - increase fiber to 30 gm per day  - increase fluids to 2-3 Liter per day  - cscope    [] ARM and biofeedback, if trial of laxatives like Senna, miralax does not work    Orders:  •  Colonoscopy Diagnostic; Future    Change in stool caliber    Orders:  •  Referral to Gastroenterology  •  Colonoscopy Diagnostic; Future    Generalized abdominal pain    Orders:  •  Referral to Gastroenterology  •  Colonoscopy Diagnostic; Future      I discussed the risks, benefits and alternative(s) of the procedure(s) with the patient. Pt verbalizes understanding and is willing to proceed. All questions were answered.    Melvin Elise MD, MS    SUBJECTIVE   HPI: History obtained from patient    53 yo who comes to see me for change in stool caliber - thin stools, feeling of  "incomplete evacuation and change in consistency - muddy stools    REDFLAGS  Pt denies any blood in stool, black stools  Pt denies unintentional weight loss, fever, chills, night sweats  Pt denies family history of GI cancer      Medical History[1]    Surgical History[2]    Current Medications[3]    Allergies[4]  OBJECTIVE   PHYSICAL EXAM:  Pulse 77   Ht 1.753 m (5' 9\")   Wt 93 kg (205 lb)   BMI 30.27 kg/m²      LABS/IMAGING/SCOPES  Lab Results   Component Value Date    WBC 6.6 09/14/2024    HGB 16.2 09/14/2024    HCT 46.4 09/14/2024    MCV 90 09/14/2024     09/14/2024     Lab Results   Component Value Date    GLUCOSE 99 09/14/2024    CALCIUM 9.2 09/14/2024     09/14/2024    K 3.8 09/14/2024    CO2 30 09/14/2024     09/14/2024    BUN 14 09/14/2024    CREATININE 0.94 09/14/2024     Lab Results   Component Value Date    ALT 22 09/14/2024    AST 20 09/14/2024    ALKPHOS 63 09/14/2024    BILITOT 0.8 09/14/2024       === 03/01/25 ===    CT CARDIAC SCORING WO IV CONTRAST    - Impression -  1. Coronary artery calcium score of 30.77*.    *Coronary Artery  Agatston score    Score  risk  Very low 1-99  Mildly increased 100-299  Moderately increased >300  Moderate to severely increased >800    Izzy et al. JCCT 2016 (http://dx.doi.org/10.1016/j.jcct.2016.11.003)    AVILA 10-Year CHD Risk with Coronary Artery Calcification can be  calcuate using link below  [email or web address]  Eleanor pena al. JACC 2015 (http://dx.doi.org/10.1016/j.j  acc.2015.08.035)    Signed by: Jose Wright 3/3/2025 12:49 PM  Dictation workstation:   [id number]    Melvin Elise MD, MS       [1]  Past Medical History:  Diagnosis Date   • Arthritis    • Chronic pain of both shoulders 02/10/2023   • Eczema    • GERD (gastroesophageal reflux disease)    • Varicella    • Visual impairment    [2]  Past Surgical History:  Procedure Laterality Date   • APPENDECTOMY     • OTHER SURGICAL " HISTORY  05/16/2019    Appendectomy   • VASECTOMY     [3]  Current Outpatient Medications   Medication Sig Dispense Refill   • dupilumab (Dupixent Pen) 300 mg/2 mL pen injector Inject 1 pen by subcutaneous injection every 14 days 2 Pen 11   • esomeprazole (NexIUM) 40 mg DR capsule Take 1 capsule (40 mg) by mouth.     • rosuvastatin (Crestor) 10 mg tablet Take 1 tablet (10 mg) by mouth once daily in the evening. 90 tablet 0   • acitretin (Soriatane) 10 mg capsule Take once daily. If well tolerated after 2-3 weeks, can increase to twice daily if skin  hasn't responded (Patient not taking: No sig reported) 60 capsule 1   • pimecrolimus (Elidel) 1 % cream Apply topically 2 times a day. to affected area (Patient not taking: Reported on 6/30/2025)       No current facility-administered medications for this visit.   [4]  No Known Allergies

## 2025-07-01 DIAGNOSIS — Z12.11 SPECIAL SCREENING FOR MALIGNANT NEOPLASMS, COLON: ICD-10-CM

## 2025-07-01 RX ORDER — SODIUM, POTASSIUM,MAG SULFATES 17.5-3.13G
SOLUTION, RECONSTITUTED, ORAL ORAL
Qty: 1 EACH | Refills: 0 | Status: SHIPPED | OUTPATIENT
Start: 2025-07-01

## 2025-07-10 ENCOUNTER — APPOINTMENT (OUTPATIENT)
Dept: OTOLARYNGOLOGY | Facility: CLINIC | Age: 55
End: 2025-07-10
Payer: COMMERCIAL

## 2025-07-10 VITALS — BODY MASS INDEX: 29.53 KG/M2 | WEIGHT: 200 LBS

## 2025-07-10 DIAGNOSIS — J34.2 DEVIATED SEPTUM: ICD-10-CM

## 2025-07-10 DIAGNOSIS — J32.9 CHRONIC SINUSITIS, UNSPECIFIED LOCATION: ICD-10-CM

## 2025-07-10 DIAGNOSIS — H90.3 BILATERAL HIGH FREQUENCY SENSORINEURAL HEARING LOSS: ICD-10-CM

## 2025-07-10 DIAGNOSIS — G47.33 OSA (OBSTRUCTIVE SLEEP APNEA): ICD-10-CM

## 2025-07-10 DIAGNOSIS — H93.13 TINNITUS OF BOTH EARS: Primary | ICD-10-CM

## 2025-07-10 PROCEDURE — 99204 OFFICE O/P NEW MOD 45 MIN: CPT | Performed by: GENERAL PRACTICE

## 2025-07-10 PROCEDURE — G8433 SCR FOR DEP NOT CPT DOC RSN: HCPCS | Performed by: GENERAL PRACTICE

## 2025-07-10 RX ORDER — TRIAMCINOLONE ACETONIDE 55 UG/1
2 SPRAY, METERED NASAL DAILY
Qty: 16.5 G | Refills: 3 | Status: SHIPPED | OUTPATIENT
Start: 2025-07-10 | End: 2026-07-10

## 2025-07-10 RX ORDER — MUPIROCIN 20 MG/G
OINTMENT TOPICAL 2 TIMES DAILY
Qty: 22 G | Refills: 2 | Status: SHIPPED | OUTPATIENT
Start: 2025-07-10 | End: 2025-07-24

## 2025-07-10 NOTE — PROGRESS NOTES
Otolaryngology - Head and Neck Surgery Outpatient New Patient Visit Note        Assessment/Plan:   Problem List Items Addressed This Visit           ICD-10-CM       ENT    Tinnitus of both ears - Primary H93.13    Relevant Orders    Referral to Audiology     Other Visit Diagnoses         Codes      Chronic sinusitis, unspecified location     J32.9    Relevant Medications    mupirocin (Bactroban) 2 % ointment    Other Relevant Orders    CT sinus wo IV contrast      Deviated septum     J34.2    Relevant Medications    triamcinolone (Nasacort) 55 mcg nasal inhaler    mupirocin (Bactroban) 2 % ointment    Other Relevant Orders    CT sinus wo IV contrast      FLORENCE (obstructive sleep apnea)     G47.33      Bilateral high frequency sensorineural hearing loss     H90.3              55yoM with tinnitus in the setting of high frequency SNHL, with some recent increase after brief trial of acetretin.   Will acquire audiogram to aid in eval.    Discussed conservative tinnitus management.     Snoring and FLORENCE in the setting of chronic nasal obtruction/congestion and deviated septum.  High rightward deviated septum with some mucoid debris on septum.  Will treat with mupirocin, nasacort and acquire CT sinus to aid in eval given limited exam due to nasal edema and septal obstruction.   Discussed possible PSG but pt declines for now.           Follow up:  -plan for follow up in clinic as needed, after completion of ordered studies, and in 1-2 months    All of the above findings, impressions, treatment planning and follow up plans were discussed with the patient who indicated understanding.  the patient was instructed to contact or return to clinic sooner if symptoms/signs persist or worsen despite the above management.      Rayo Sesay MD  Otolaryngology - Head and Neck Surgery            History Of Present Illness  Jose Prescott is a 55 y.o. male presenting for tinnitus and hearing loss.     The patient reports slow progression of the  hearing loss over time.  The migueltent reports a history of intermittent, waxing/waning, nonpulsatile, tonal tinnitus which does not interfere with hearing.   Increase in tinnitus within the last year, associates increase after use of acetretin (since stopped).   The patient reports a history of significant noise exposure due to occupational exposures, industrial noise, etc.     The patient denies sudden changes in hearing.  The patient denies otalgia, otorrhea, vertigo, or facial weakness.    The patient denies a history of otologic surgery or trauma.  The patient denies a history of blast injury, TBI or concussion associated with hearing loss.  The patient denies a family history of significant hearing loss.  The patient reports a history of AOM as a child, but no recent significant history of ear infection.  No reported exposure to known ototoxins (chemotherapeutics, aminoglycosides, loop diurectics, high dose NSAIDs).   No reported history of radiation treatment to the head/neck.     The pt also notes a history of nasal obstruction/congestion.   Notes a history of nasal procedure after fracture, unsure of details of procedure.   Notes multiple prior cauteries for epistaxis. No recent epistaxis.  No obvious allergic rhinitis  Notes some chronic sinus pressure/congestion and waxing/waning rhinorrhea.     Limited relief with prior use of flonase.     Notes a history of snoring with witnessed apneic pauses.                 Past Medical History  He has a past medical history of Arthritis, Chronic pain of both shoulders (02/10/2023), Eczema, GERD (gastroesophageal reflux disease), Tinnitus, Varicella, and Visual impairment.    Surgical History  He has a past surgical history that includes Other surgical history (05/16/2019); Appendectomy; Vasectomy; and Sinus surgery.     Social History  He reports that he has never smoked. He has never used smokeless tobacco. He reports current alcohol use of about 2.0 standard drinks  of alcohol per week. He reports current drug use. Frequency: 7.00 times per week. Drug: Marijuana.    Family History  Family History[1]     Allergies  Patient has no known allergies.    Review of Systems  ROS: Pertinent positives as noted in HPI.    - CONSTITUTIONAL: Does not report weight loss, fever or chills.    - HEENT:   Ear: Does not report  , vertigo,    , otalgia, otorrhea  Nose: Does not report  ,  , epistaxis, decreased smell  Throat: Does not report pain, dysphagia, odynophagia  Larynx: Does not report hoarseness,  difficulty breathing, pain with speaking (odynophonia)  Neck: Does not report new masses, pain, swelling  Face: Does not report       , swelling, numbness, weakness     - RESPIRATORY: Does not report SOB or cough.    - CV: Does not report palpitations or chest pain.     - GI: Does not report abdominal pain, nausea, vomiting or diarrhea.    - : Does not report dysuria or urinary frequency.    - MSK: Does not report myalgia or joint pain.    - SKIN: Does not report rash or pruritus.    - NEUROLOGICAL: Does not report headache or syncope.    - PSYCHIATRIC: Does not report recent changes in mood. Does not report anxiety or depression.         Physical Exam:     GENERAL:   Alert & Oriented to person, place and time; Normal affect and appearance. Well developed and well nourished. Conversant & cooperative with examination.     HEAD:   Normocephalic, atraumatic. No sinus tenderness to palpation. Normal parotid bilaterally. Normal facial strength.     NEUROLOGIC:   Cranial nerves II-XII grossly intact, gait WNL. Normal mood and affect.    EYES:   Extraocular movements intact. Pupils equal, round, reactive to light and accommodation. No nystagmus, no ptosis. no scleral injection.    EAR:   Normal auricle. No discomfort or TTP with manipulation.   Handheld otoscopic exam showed normal external auditory canals bilaterally. No purulence or EAC inflammation. Minimal cerumen.   Right tympanic membrane  clear and mobile without evidence of perforation, retraction or middle ear effusion.   Left tympanic membrane clear and mobile without evidence of perforation, retraction or middle ear effusion.     NOSE:   No external deformity. No external nasal lesions, lacerations, or scars. Nasal tip symmetrical with normal nasal valves.   Nasal cavity with high rightward deviated   septum, edematous mucosa and turbinates. Raw mucosa and mucoid debris on R>L septum.   No lesions, masses, purulence or polyps.     OC/OP:   Mucous membranes moist, no masses, lesions or exudates.   Normal tongue, floor of mouth, teeth, gums, lips. Normal posterior pharyngeal wall.    Normal tonsils without erythema, exudate or obvious calculi     NECK:   No neck masses or thyroid enlargement. Trachea midline. No tenderness to palpation    LYMPHATIC:   No cervical lymphadenopathy.     RESPIRATORY:   Symmetric chest elevation & no retractions. No significant hoarseness. No increased work of breathing.    CV:   No clubbing or cyanosis. No obvious edema    Skin:   No facial rashes, vesicles or lesions.     Extremities:   No gross abnormalities      Clinic Procedure        Information review:  External sources (notes, imaging, lab results) listed below personally reviewed to aid in medical decision making process.  -screening audio 11/29/22, 11/28/23, 11/13/24 with high frequency SNHL b/l.   -  -         [1]   Family History  Problem Relation Name Age of Onset    Arthritis Mother      Ulcerative colitis Mother      Heart attack Father      Diabetes Father      Melanoma Mother S

## 2025-07-21 ENCOUNTER — APPOINTMENT (OUTPATIENT)
Dept: GASTROENTEROLOGY | Facility: EXTERNAL LOCATION | Age: 55
End: 2025-07-21
Payer: COMMERCIAL

## 2025-07-21 DIAGNOSIS — Z12.11 COLON CANCER SCREENING: ICD-10-CM

## 2025-07-21 DIAGNOSIS — R10.84 GENERALIZED ABDOMINAL PAIN: ICD-10-CM

## 2025-07-21 DIAGNOSIS — D12.3 BENIGN NEOPLASM OF TRANSVERSE COLON: ICD-10-CM

## 2025-07-21 DIAGNOSIS — R19.5 CHANGE IN STOOL CALIBER: ICD-10-CM

## 2025-07-21 DIAGNOSIS — Z86.0101 PERSONAL HISTORY OF ADENOMATOUS AND SERRATED COLON POLYPS: ICD-10-CM

## 2025-07-21 DIAGNOSIS — R19.4 CHANGE IN BOWEL HABIT: Primary | ICD-10-CM

## 2025-07-21 PROCEDURE — 45385 COLONOSCOPY W/LESION REMOVAL: CPT | Performed by: INTERNAL MEDICINE

## 2025-07-21 PROCEDURE — 88305 TISSUE EXAM BY PATHOLOGIST: CPT | Performed by: PATHOLOGY

## 2025-07-21 PROCEDURE — 88305 TISSUE EXAM BY PATHOLOGIST: CPT

## 2025-07-21 PROCEDURE — 0753T DGTZ GLS MCRSCP SLD LEVEL IV: CPT

## 2025-07-22 ENCOUNTER — LAB REQUISITION (OUTPATIENT)
Dept: LAB | Facility: HOSPITAL | Age: 55
End: 2025-07-22
Payer: COMMERCIAL

## 2025-07-22 DIAGNOSIS — R19.4 CHANGE IN BOWEL HABIT: ICD-10-CM

## 2025-07-30 LAB
LABORATORY COMMENT REPORT: NORMAL
PATH REPORT.COMMENTS IMP SPEC: NORMAL
PATH REPORT.FINAL DX SPEC: NORMAL
PATH REPORT.GROSS SPEC: NORMAL
PATH REPORT.RELEVANT HX SPEC: NORMAL
PATH REPORT.TOTAL CANCER: NORMAL

## 2025-08-11 ENCOUNTER — APPOINTMENT (OUTPATIENT)
Dept: RADIOLOGY | Facility: CLINIC | Age: 55
End: 2025-08-11
Payer: COMMERCIAL

## 2025-08-18 ENCOUNTER — APPOINTMENT (OUTPATIENT)
Dept: PRIMARY CARE | Facility: CLINIC | Age: 55
End: 2025-08-18
Payer: COMMERCIAL

## 2025-08-18 VITALS
DIASTOLIC BLOOD PRESSURE: 78 MMHG | WEIGHT: 204 LBS | HEIGHT: 70 IN | SYSTOLIC BLOOD PRESSURE: 118 MMHG | HEART RATE: 76 BPM | BODY MASS INDEX: 29.2 KG/M2 | OXYGEN SATURATION: 95 %

## 2025-08-18 DIAGNOSIS — G89.29 HEEL PAIN, CHRONIC, RIGHT: Primary | ICD-10-CM

## 2025-08-18 DIAGNOSIS — M79.671 HEEL PAIN, CHRONIC, RIGHT: Primary | ICD-10-CM

## 2025-08-18 DIAGNOSIS — M79.671 FOOT PAIN, RIGHT: ICD-10-CM

## 2025-08-18 PROCEDURE — 99213 OFFICE O/P EST LOW 20 MIN: CPT | Performed by: FAMILY MEDICINE

## 2025-08-18 PROCEDURE — 1036F TOBACCO NON-USER: CPT | Performed by: FAMILY MEDICINE

## 2025-08-18 PROCEDURE — 3008F BODY MASS INDEX DOCD: CPT | Performed by: FAMILY MEDICINE

## 2025-08-18 ASSESSMENT — ENCOUNTER SYMPTOMS
BRUISES/BLEEDS EASILY: 0
CHEST TIGHTNESS: 0
COUGH: 0
ABDOMINAL DISTENTION: 0
EYE REDNESS: 0
SORE THROAT: 0
DYSURIA: 0
DIZZINESS: 0
CONSTIPATION: 0
CHILLS: 0
FATIGUE: 0
FEVER: 0
EYE PAIN: 0
APPETITE CHANGE: 0
ARTHRALGIAS: 0
DIFFICULTY URINATING: 0
DYSPHORIC MOOD: 0
ADENOPATHY: 0
WEAKNESS: 0
BACK PAIN: 0
SHORTNESS OF BREATH: 0
HEADACHES: 0
ABDOMINAL PAIN: 0
NERVOUS/ANXIOUS: 0
DIARRHEA: 0
BLOOD IN STOOL: 0

## 2025-08-18 ASSESSMENT — PAIN SCALES - GENERAL: PAINLEVEL_OUTOF10: 7

## 2025-08-20 ENCOUNTER — CLINICAL SUPPORT (OUTPATIENT)
Dept: AUDIOLOGY | Facility: CLINIC | Age: 55
End: 2025-08-20
Payer: COMMERCIAL

## 2025-08-20 DIAGNOSIS — H93.13 SUBJECTIVE TINNITUS OF BOTH EARS: Primary | ICD-10-CM

## 2025-08-20 PROCEDURE — 92567 TYMPANOMETRY: CPT

## 2025-08-20 PROCEDURE — 92557 COMPREHENSIVE HEARING TEST: CPT

## 2025-08-20 ASSESSMENT — PAIN - FUNCTIONAL ASSESSMENT: PAIN_FUNCTIONAL_ASSESSMENT: 0-10

## 2025-08-20 ASSESSMENT — PAIN SCALES - GENERAL: PAINLEVEL_OUTOF10: 0 - NO PAIN

## 2025-09-02 ENCOUNTER — HOSPITAL ENCOUNTER (OUTPATIENT)
Dept: RADIOLOGY | Facility: HOSPITAL | Age: 55
Discharge: HOME | End: 2025-09-02
Payer: COMMERCIAL

## 2025-09-02 DIAGNOSIS — J32.9 CHRONIC SINUSITIS, UNSPECIFIED LOCATION: ICD-10-CM

## 2025-09-02 DIAGNOSIS — J34.2 DEVIATED SEPTUM: ICD-10-CM

## 2025-09-02 PROCEDURE — 70486 CT MAXILLOFACIAL W/O DYE: CPT

## 2025-09-02 PROCEDURE — 70486 CT MAXILLOFACIAL W/O DYE: CPT | Performed by: RADIOLOGY

## 2025-09-19 ENCOUNTER — APPOINTMENT (OUTPATIENT)
Dept: PRIMARY CARE | Facility: CLINIC | Age: 55
End: 2025-09-19
Payer: COMMERCIAL

## 2025-10-21 ENCOUNTER — APPOINTMENT (OUTPATIENT)
Dept: PODIATRY | Facility: CLINIC | Age: 55
End: 2025-10-21
Payer: COMMERCIAL